# Patient Record
Sex: FEMALE | Race: WHITE
[De-identification: names, ages, dates, MRNs, and addresses within clinical notes are randomized per-mention and may not be internally consistent; named-entity substitution may affect disease eponyms.]

---

## 2020-01-16 ENCOUNTER — HOSPITAL ENCOUNTER (INPATIENT)
Dept: HOSPITAL 19 - COL.ER | Age: 85
LOS: 5 days | Discharge: NURSING, REHAB OR LONG-TERM CARE | DRG: 480 | End: 2020-01-21
Attending: ORTHOPAEDIC SURGERY | Admitting: ORTHOPAEDIC SURGERY
Payer: MEDICARE

## 2020-01-16 VITALS — SYSTOLIC BLOOD PRESSURE: 147 MMHG | HEART RATE: 67 BPM | DIASTOLIC BLOOD PRESSURE: 62 MMHG | TEMPERATURE: 98.5 F

## 2020-01-16 VITALS — HEART RATE: 82 BPM | SYSTOLIC BLOOD PRESSURE: 18125 MMHG | TEMPERATURE: 97.3 F | DIASTOLIC BLOOD PRESSURE: 53 MMHG

## 2020-01-16 VITALS — HEIGHT: 60.98 IN | WEIGHT: 93.92 LBS | BODY MASS INDEX: 17.73 KG/M2

## 2020-01-16 DIAGNOSIS — E89.0: ICD-10-CM

## 2020-01-16 DIAGNOSIS — I50.9: ICD-10-CM

## 2020-01-16 DIAGNOSIS — D72.829: ICD-10-CM
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
## 2020-01-16 DIAGNOSIS — H35.30: ICD-10-CM

## 2020-01-16 DIAGNOSIS — I13.0: ICD-10-CM

## 2020-01-16 DIAGNOSIS — E44.0: ICD-10-CM

## 2020-01-16 DIAGNOSIS — N18.9: ICD-10-CM

## 2020-01-16 DIAGNOSIS — K21.9: ICD-10-CM

## 2020-01-16 DIAGNOSIS — E87.6: ICD-10-CM

## 2020-01-16 DIAGNOSIS — Z90.710: ICD-10-CM

## 2020-01-16 DIAGNOSIS — D64.9: ICD-10-CM

## 2020-01-16 DIAGNOSIS — E78.5: ICD-10-CM

## 2020-01-16 DIAGNOSIS — I08.0: ICD-10-CM

## 2020-01-16 DIAGNOSIS — S42.202A: ICD-10-CM

## 2020-01-16 DIAGNOSIS — J96.01: ICD-10-CM

## 2020-01-16 DIAGNOSIS — F17.210: ICD-10-CM

## 2020-01-16 DIAGNOSIS — S72.142A: Primary | ICD-10-CM

## 2020-01-16 LAB
ALBUMIN SERPL-MCNC: 3.7 GM/DL (ref 3.5–5)
ALP SERPL-CCNC: 99 U/L (ref 50–136)
ALT SERPL-CCNC: 22 U/L (ref 9–52)
ANION GAP SERPL CALC-SCNC: 5 MMOL/L (ref 7–16)
AST SERPL-CCNC: 23 U/L (ref 15–37)
BASOPHILS # BLD: 0 10*3/UL (ref 0–0.2)
BASOPHILS NFR BLD AUTO: 0.3 % (ref 0–2)
BILIRUB SERPL-MCNC: 0.6 MG/DL (ref 0–1)
BUN SERPL-MCNC: 19 MG/DL (ref 7–17)
CALCIUM SERPL-MCNC: 8.9 MG/DL (ref 8.4–10.2)
CHLORIDE SERPL-SCNC: 102 MMOL/L (ref 98–107)
CK SERPL-CCNC: 25 U/L (ref 30–135)
CO2 SERPL-SCNC: 32 MMOL/L (ref 22–30)
CREAT SERPL-SCNC: 0.65 UMOL/L (ref 0.52–1.25)
EOSINOPHIL # BLD: 0 10*3/UL (ref 0–0.7)
EOSINOPHIL NFR BLD: 0 % (ref 0–4)
ERYTHROCYTE [DISTWIDTH] IN BLOOD BY AUTOMATED COUNT: 14.3 % (ref 11.5–14.5)
GLUCOSE SERPL-MCNC: 121 MG/DL (ref 74–106)
GRANULOCYTES # BLD AUTO: 85.7 % (ref 42.2–75.2)
HCT VFR BLD AUTO: 33.2 % (ref 37–47)
HGB BLD-MCNC: 10.5 G/DL (ref 12.5–16)
INR BLD: 1 (ref 0.8–3)
LYMPHOCYTES # BLD: 0.7 10*3/UL (ref 1.2–3.4)
LYMPHOCYTES NFR BLD: 6.6 % (ref 20–51)
MCH RBC QN AUTO: 31 PG (ref 27–31)
MCHC RBC AUTO-ENTMCNC: 32 G/DL (ref 33–37)
MCV RBC AUTO: 97 FL (ref 80–100)
MONOCYTES # BLD: 0.8 10*3/UL (ref 0.1–0.6)
MONOCYTES NFR BLD AUTO: 6.7 % (ref 1.7–9.3)
NEUTROPHILS # BLD: 9.5 10*3/UL (ref 1.4–6.5)
PH UR STRIP.AUTO: 6 [PH] (ref 5–8)
PLATELET # BLD AUTO: 207 K/MM3 (ref 130–400)
PMV BLD AUTO: 9.6 FL (ref 7.4–10.4)
POTASSIUM SERPL-SCNC: 4.1 MMOL/L (ref 3.4–5)
PRE ALBUMIN: 15.2 MG/DL (ref 17.6–36)
PROT SERPL-MCNC: 6.9 GM/DL (ref 6.4–8.2)
PROTHROMBIN TIME: 11.8 SECONDS (ref 9.7–12.8)
RBC # BLD AUTO: 3.44 M/MM3 (ref 4.1–5.3)
RBC # UR: (no result) /HPF
SODIUM SERPL-SCNC: 138 MMOL/L (ref 137–145)
SP GR UR STRIP.AUTO: 1.02 (ref 1–1.03)
UA DIPSTICK PNL UR STRIP.AUTO: (no result)
URN COLLECT METHOD CLASS: (no result)
UROBILINOGEN UR STRIP.AUTO-MCNC: 2 MG/DL

## 2020-01-16 PROCEDURE — A9284 NON-ELECTRONIC SPIROMETER: HCPCS

## 2020-01-16 PROCEDURE — C1713 ANCHOR/SCREW BN/BN,TIS/BN: HCPCS

## 2020-01-16 NOTE — NUR
Patient up from ER alert and oriented x 3. Assessment complete. Daughter at
bedside. Hospitalist in to see patient. Víctor acosta and SCD to RLE. States pain
to left hip 6/10, medications given per orders. Daughter and patient oriented
to room. Initial and 5 page complete. Denies further needs at this time. Will
report off to night shift.

## 2020-01-16 NOTE — NUR
Report received. Assumed care for night shift. Assessment complete. VS stable.
A&Ox3-drowsy. Denies pain/shortness of breath/nausea. IV to Left wrist
#22g-fluids infusing without difficulty. Olmos cath draining clear yellow
urine. LEW/SCD to right lower extremity. Plan of care discussed for bedrest,
NPO status and pain control. Verbalizes understanding. Call light in reach.
Encouraged to call for needs. Will monitor.

## 2020-01-17 VITALS — SYSTOLIC BLOOD PRESSURE: 138 MMHG | TEMPERATURE: 98 F | HEART RATE: 66 BPM | DIASTOLIC BLOOD PRESSURE: 62 MMHG

## 2020-01-17 VITALS — DIASTOLIC BLOOD PRESSURE: 106 MMHG | SYSTOLIC BLOOD PRESSURE: 139 MMHG | HEART RATE: 64 BPM

## 2020-01-17 VITALS — SYSTOLIC BLOOD PRESSURE: 130 MMHG | DIASTOLIC BLOOD PRESSURE: 96 MMHG | HEART RATE: 61 BPM

## 2020-01-17 VITALS — DIASTOLIC BLOOD PRESSURE: 56 MMHG | HEART RATE: 63 BPM | TEMPERATURE: 98.2 F | SYSTOLIC BLOOD PRESSURE: 138 MMHG

## 2020-01-17 VITALS — SYSTOLIC BLOOD PRESSURE: 144 MMHG | HEART RATE: 62 BPM | DIASTOLIC BLOOD PRESSURE: 64 MMHG

## 2020-01-17 VITALS — HEART RATE: 65 BPM | DIASTOLIC BLOOD PRESSURE: 104 MMHG | SYSTOLIC BLOOD PRESSURE: 144 MMHG

## 2020-01-17 VITALS — HEART RATE: 63 BPM | DIASTOLIC BLOOD PRESSURE: 62 MMHG | SYSTOLIC BLOOD PRESSURE: 152 MMHG

## 2020-01-17 VITALS — SYSTOLIC BLOOD PRESSURE: 149 MMHG | DIASTOLIC BLOOD PRESSURE: 63 MMHG | HEART RATE: 58 BPM

## 2020-01-17 VITALS — SYSTOLIC BLOOD PRESSURE: 157 MMHG | DIASTOLIC BLOOD PRESSURE: 51 MMHG | HEART RATE: 58 BPM

## 2020-01-17 VITALS — SYSTOLIC BLOOD PRESSURE: 127 MMHG | HEART RATE: 65 BPM | TEMPERATURE: 98.4 F | DIASTOLIC BLOOD PRESSURE: 45 MMHG

## 2020-01-17 VITALS — HEART RATE: 70 BPM | SYSTOLIC BLOOD PRESSURE: 132 MMHG | DIASTOLIC BLOOD PRESSURE: 59 MMHG

## 2020-01-17 LAB
ANION GAP SERPL CALC-SCNC: 4 MMOL/L (ref 7–16)
BASOPHILS # BLD: 0 10*3/UL (ref 0–0.2)
BASOPHILS NFR BLD AUTO: 0.4 % (ref 0–2)
BUN SERPL-MCNC: 17 MG/DL (ref 7–17)
CALCIUM SERPL-MCNC: 8.3 MG/DL (ref 8.4–10.2)
CHLORIDE SERPL-SCNC: 100 MMOL/L (ref 98–107)
CO2 SERPL-SCNC: 31 MMOL/L (ref 22–30)
CREAT SERPL-SCNC: 0.68 UMOL/L (ref 0.52–1.25)
EOSINOPHIL # BLD: 0 10*3/UL (ref 0–0.7)
EOSINOPHIL NFR BLD: 0.1 % (ref 0–4)
ERYTHROCYTE [DISTWIDTH] IN BLOOD BY AUTOMATED COUNT: 14.4 % (ref 11.5–14.5)
GLUCOSE SERPL-MCNC: 106 MG/DL (ref 74–106)
GRANULOCYTES # BLD AUTO: 75.2 % (ref 42.2–75.2)
HCT VFR BLD AUTO: 29 % (ref 37–47)
HGB BLD-MCNC: 9.2 G/DL (ref 12.5–16)
LYMPHOCYTES # BLD: 1.3 10*3/UL (ref 1.2–3.4)
LYMPHOCYTES NFR BLD: 14.2 % (ref 20–51)
MCH RBC QN AUTO: 31 PG (ref 27–31)
MCHC RBC AUTO-ENTMCNC: 32 G/DL (ref 33–37)
MCV RBC AUTO: 97 FL (ref 80–100)
MONOCYTES # BLD: 0.9 10*3/UL (ref 0.1–0.6)
MONOCYTES NFR BLD AUTO: 9.7 % (ref 1.7–9.3)
NEUTROPHILS # BLD: 6.8 10*3/UL (ref 1.4–6.5)
PLATELET # BLD AUTO: 200 K/MM3 (ref 130–400)
PMV BLD AUTO: 10.1 FL (ref 7.4–10.4)
POTASSIUM SERPL-SCNC: 3.7 MMOL/L (ref 3.4–5)
RBC # BLD AUTO: 2.99 M/MM3 (ref 4.1–5.3)
SODIUM SERPL-SCNC: 136 MMOL/L (ref 137–145)

## 2020-01-17 PROCEDURE — 0QS706Z REPOSITION LEFT UPPER FEMUR WITH INTRAMEDULLARY INTERNAL FIXATION DEVICE, OPEN APPROACH: ICD-10-PCS | Performed by: ORTHOPAEDIC SURGERY

## 2020-01-17 PROCEDURE — BW1JYZZ FLUOROSCOPY OF UPPER EXTREMITY USING OTHER CONTRAST: ICD-10-PCS | Performed by: ORTHOPAEDIC SURGERY

## 2020-01-17 PROCEDURE — 0QS704Z REPOSITION LEFT UPPER FEMUR WITH INTERNAL FIXATION DEVICE, OPEN APPROACH: ICD-10-PCS | Performed by: ORTHOPAEDIC SURGERY

## 2020-01-17 NOTE — NUR
Patient has done well since up from OR. Fluid bolus infusing per orders.
Denies pain at this time. LUE in sling. Left hip sites with gauze. Denies
further needs at this time. Will report off to night shift.

## 2020-01-17 NOTE — NUR
Report received. Assumed care for night shift. Assessment complete. A&Ox3-some
confused conversation but re-orients on own. IV to right forearm pulled
out-cath intact.  Restarted in left [id number] attempt. Blood pressure
taken manually as monitor reading high-VS are WNL. Olmos cath driaining clear
yellow urine.  Output has increased since 1800 fluid bolus. Total of 125 out
in two hours.  Denies pain/nausea/shortness of breath. Dressings x3 to left
hip-gauze/tegaderm. Top dressing totally saturated with bright red blood.
Ortho on call notified with new orders to re-enforce with 4x4s/ABDs/foam tape.
Completed and tolerated well. Will monitor drainage. Denies questions or
concerns. Call light in reach. Bed alarm on. Will monitor.

## 2020-01-17 NOTE — NUR
Contacted Dr. Nelson, patient having low urine out put and SBP in 100's. Fluid
bolus started per orders, will continue to monitor BP.

## 2020-01-17 NOTE — NUR
ASHKAN met with the patient and her daughter, Michelle Whitehead (ph#[phone]), to
discuss discharge plan. The patient lives in [place] with her daughter
(Michelle) and son-in-law. She reports independence with ADLs and has two canes,
two walkers, and a wheelchair available if needed. The patient's PCP is Dr. Ree Bautista and she receives her medications at the [facility] in [facility]. She reports no difficulties obtaining her meds. The patient does not
have advanced directives in EMR, but her her daughter states that she does
have them completed and that they are somewhere at home. Michelle states that
she is that patient's DPOA-HC. The patient had a left hip fracture. ASHKAN
discussed post-acute rehab. The patient and her daughter were agreeable to
this. ASHKAN presented and explained the Patient Choice Form to the patient's
daughter and provided her with Medicare.gov's list of nursing homes in the
[place] area. The patient and her daughter chose 1) [facility] 2) [facility]. Patient Choice Form signed by the patient's
daughter and she was provided a copy. ASHKAN consulted Minerva with [facility]. ASHKAN
contacted and faxed a referral to Felicitas at [facility]. Felicitas, at [facility],
reports that if able to accept, that they would not be able to take on the
weekend. SW awaiting their screens and will continue to follow.

## 2020-01-17 NOTE — NUR
Rested off and on this shift. Pain adequately controlled with oral pain meds.
Ambulated in hallway for approx 100feet this shift. Tolerated well. Denies
nausea/shortness of breath. SCDs/Roque bilat. Has rotated ice off an on this
shift. Call light in reach. WIll monitor.

## 2020-01-17 NOTE — NUR
Patient up from OR, Alert and oriented. Post op VSS. Daughter at bedside.
Denies further needs at this time.

## 2020-01-17 NOTE — NUR
C/O pain to left hip-rates 8/10 to left hip-described as constant
ache/intermittent sharp. Morphine 2mg given IV per dr order due to NPO status.
New consent form signed and on chart. Denies questions or concerns. Denies
nasuea/shortness of breath. Call light in reach. Will monitor.

## 2020-01-18 VITALS — DIASTOLIC BLOOD PRESSURE: 47 MMHG | SYSTOLIC BLOOD PRESSURE: 134 MMHG | HEART RATE: 61 BPM | TEMPERATURE: 98.1 F

## 2020-01-18 VITALS — HEART RATE: 63 BPM | TEMPERATURE: 97.7 F | SYSTOLIC BLOOD PRESSURE: 154 MMHG | DIASTOLIC BLOOD PRESSURE: 58 MMHG

## 2020-01-18 VITALS — SYSTOLIC BLOOD PRESSURE: 162 MMHG | HEART RATE: 62 BPM | DIASTOLIC BLOOD PRESSURE: 60 MMHG | TEMPERATURE: 98 F

## 2020-01-18 VITALS — SYSTOLIC BLOOD PRESSURE: 163 MMHG | TEMPERATURE: 97.5 F | HEART RATE: 69 BPM | DIASTOLIC BLOOD PRESSURE: 67 MMHG

## 2020-01-18 VITALS — HEART RATE: 68 BPM | DIASTOLIC BLOOD PRESSURE: 57 MMHG | SYSTOLIC BLOOD PRESSURE: 167 MMHG | TEMPERATURE: 97.5 F

## 2020-01-18 VITALS — TEMPERATURE: 97.4 F | DIASTOLIC BLOOD PRESSURE: 57 MMHG | HEART RATE: 64 BPM | SYSTOLIC BLOOD PRESSURE: 167 MMHG

## 2020-01-18 VITALS — SYSTOLIC BLOOD PRESSURE: 148 MMHG | DIASTOLIC BLOOD PRESSURE: 68 MMHG

## 2020-01-18 LAB
HCT VFR BLD AUTO: 26.8 % (ref 37–47)
HGB BLD-MCNC: 8.4 G/DL (ref 12.5–16)

## 2020-01-18 NOTE — NUR
Called to room stating she wants to leave and does not want to stay here any
longer. Is A&Ox3-will have a brief moment of confusion but re-orients self and
laughs at her mistake. Rating pain 4/10 to shoulder/hip-Hydrocodone one tab
given per dr order.  Denies nausea/shortness of breath. Discussed sling
again and allowed this nurse to re-apply. Tolerated some clear liquids-refused
PO for most of shift. Denies questions or concerns. Call light in reach/bed
alarm on. Will monitor.

## 2020-01-18 NOTE — NUR
Notified by PCT of elevated blood pressure. Taken manually by this
nurse-148/68. Has been resting better since Morphine/zofran combo. Output has
been 250mls in two hours. Has tolerated some clear liquids. Still refusing to
wear sling or apply ice to hip. Repositioned with pillows. Denies
nausea/pain/shortness of breath. SCDs/TEDs bilat. Call light in reach. Bed
alram on. Will monitor.

## 2020-01-18 NOTE — NUR
Patient has done well throughout the day. Minimal needs. Pain medications
given x2 today per orders. Encouraged patient to increase use of left upper
extremity. Occassionally confused. States she cannot  her left arm, later
will use arm. Has been up in recliner the majority of the day. Olmos catheter
discontiued today and patient voiding without difficulties. Has been up to
comode with x1 assist. Denies further needs at this time. Daughter at bedside
this afternoon. Reported off to night shift.

## 2020-01-18 NOTE — NUR
Repositioned at this time with pillow support. A&Ox3-very agitated. Urine
output has been adequate since-but on the low side of 50mls/hr. Has
continued to take sling off left arm stating she cant take having anything on
that long and around her arm. States she feels as if she is restrained.
Explained importance of sling but adamantly refuses to reapply. Complaining of
nausea as well as pain. Dry heaved several times, has not tolerated any PO
except some water. Refused any type of PO intake thus far. Zofran 4mg given
IV per dr order followed by Morphine 2mg for pain to left hip/left shoulder.
States left hip pain is mild compared to left shoulder pain. Rating pain 8/10
described as a constant ache/throb.  Seems slightly agitated. States she
does not want to be here any longer and is ready to go home. States she will
not go to therapy as she had to do that before and would rather die. After
discussing she understands the importance but states she still will just leave
today. States she is much more comfortable than she was and will try to
rest-"lack of sleep has me in a bad way." Will continue to monitor.

## 2020-01-19 VITALS — SYSTOLIC BLOOD PRESSURE: 130 MMHG | HEART RATE: 61 BPM | TEMPERATURE: 97.9 F | DIASTOLIC BLOOD PRESSURE: 49 MMHG

## 2020-01-19 VITALS — SYSTOLIC BLOOD PRESSURE: 166 MMHG | DIASTOLIC BLOOD PRESSURE: 72 MMHG | TEMPERATURE: 98.2 F | HEART RATE: 78 BPM

## 2020-01-19 VITALS — HEART RATE: 62 BPM | DIASTOLIC BLOOD PRESSURE: 49 MMHG | SYSTOLIC BLOOD PRESSURE: 125 MMHG | TEMPERATURE: 98.2 F

## 2020-01-19 VITALS — TEMPERATURE: 98.8 F | DIASTOLIC BLOOD PRESSURE: 49 MMHG | HEART RATE: 66 BPM | SYSTOLIC BLOOD PRESSURE: 155 MMHG

## 2020-01-19 VITALS — SYSTOLIC BLOOD PRESSURE: 157 MMHG | DIASTOLIC BLOOD PRESSURE: 50 MMHG | TEMPERATURE: 97.9 F | HEART RATE: 66 BPM

## 2020-01-19 LAB
ANION GAP SERPL CALC-SCNC: 4 MMOL/L (ref 7–16)
BASOPHILS # BLD: 0 10*3/UL (ref 0–0.2)
BASOPHILS NFR BLD AUTO: 0.1 % (ref 0–2)
BUN SERPL-MCNC: 17 MG/DL (ref 7–17)
CALCIUM SERPL-MCNC: 8.5 MG/DL (ref 8.4–10.2)
CHLORIDE SERPL-SCNC: 102 MMOL/L (ref 98–107)
CO2 SERPL-SCNC: 30 MMOL/L (ref 22–30)
CREAT SERPL-SCNC: 0.68 UMOL/L (ref 0.52–1.25)
EOSINOPHIL # BLD: 0 10*3/UL (ref 0–0.7)
EOSINOPHIL NFR BLD: 0 % (ref 0–4)
ERYTHROCYTE [DISTWIDTH] IN BLOOD BY AUTOMATED COUNT: 14.6 % (ref 11.5–14.5)
GLUCOSE SERPL-MCNC: 78 MG/DL (ref 74–106)
GRANULOCYTES # BLD AUTO: 80.2 % (ref 42.2–75.2)
HCT VFR BLD AUTO: 25.3 % (ref 37–47)
HGB BLD-MCNC: 8.1 G/DL (ref 12.5–16)
LYMPHOCYTES # BLD: 1 10*3/UL (ref 1.2–3.4)
LYMPHOCYTES NFR BLD: 11.8 % (ref 20–51)
MCH RBC QN AUTO: 31 PG (ref 27–31)
MCHC RBC AUTO-ENTMCNC: 32 G/DL (ref 33–37)
MCV RBC AUTO: 97 FL (ref 80–100)
MONOCYTES # BLD: 0.6 10*3/UL (ref 0.1–0.6)
MONOCYTES NFR BLD AUTO: 7.4 % (ref 1.7–9.3)
NEUTROPHILS # BLD: 6.6 10*3/UL (ref 1.4–6.5)
PLATELET # BLD AUTO: 193 K/MM3 (ref 130–400)
PMV BLD AUTO: 9.7 FL (ref 7.4–10.4)
POTASSIUM SERPL-SCNC: 3.6 MMOL/L (ref 3.4–5)
RBC # BLD AUTO: 2.61 M/MM3 (ref 4.1–5.3)
SODIUM SERPL-SCNC: 136 MMOL/L (ref 137–145)

## 2020-01-19 NOTE — NUR
Patient resting in bed. Reina assisted her with a shower this afternoon. Her
family also visited. Norco for pain managment. She continues to have more
complaints of shoulder pain than hip pain, that is worse with movement. She
has been continent of urine, using bedside commode with assist. Int. Teds off
at this time after shower. Patient has tolerated meals. Alerternative meals
offered. She is wanting to nap. Will monitor

## 2020-01-19 NOTE — NUR
PT MEDICATED TWICE FOR HIP PAIN.  PT TRANSFERS POORLY.  PT NOT VERY MOTIVATED
TO MOVE.  NEW ICE APPLIED.

## 2020-01-19 NOTE — NUR
Patient resting in bed. She worked with therapy and did fairly well. She
tolerated her breakfast. Reports not being a big eater. Ortho rounded. Left
hip drg CDI. Complaints of L.shoudler pain. Teds & scds. Int. Will monitor.

## 2020-01-20 VITALS — DIASTOLIC BLOOD PRESSURE: 93 MMHG | SYSTOLIC BLOOD PRESSURE: 123 MMHG | TEMPERATURE: 100 F | HEART RATE: 78 BPM

## 2020-01-20 VITALS — SYSTOLIC BLOOD PRESSURE: 159 MMHG | HEART RATE: 73 BPM | TEMPERATURE: 99.8 F | DIASTOLIC BLOOD PRESSURE: 54 MMHG

## 2020-01-20 VITALS — SYSTOLIC BLOOD PRESSURE: 125 MMHG | HEART RATE: 62 BPM | TEMPERATURE: 98.8 F | DIASTOLIC BLOOD PRESSURE: 42 MMHG

## 2020-01-20 VITALS — HEART RATE: 71 BPM | TEMPERATURE: 98.6 F | SYSTOLIC BLOOD PRESSURE: 136 MMHG | DIASTOLIC BLOOD PRESSURE: 42 MMHG

## 2020-01-20 VITALS — TEMPERATURE: 99.4 F | DIASTOLIC BLOOD PRESSURE: 45 MMHG | HEART RATE: 74 BPM | SYSTOLIC BLOOD PRESSURE: 145 MMHG

## 2020-01-20 VITALS — HEART RATE: 82 BPM | DIASTOLIC BLOOD PRESSURE: 63 MMHG | SYSTOLIC BLOOD PRESSURE: 165 MMHG | TEMPERATURE: 99 F

## 2020-01-20 LAB
ANION GAP SERPL CALC-SCNC: 3 MMOL/L (ref 7–16)
BASOPHILS # BLD: 0 10*3/UL (ref 0–0.2)
BASOPHILS NFR BLD AUTO: 0.2 % (ref 0–2)
BUN SERPL-MCNC: 18 MG/DL (ref 7–17)
CALCIUM SERPL-MCNC: 8.1 MG/DL (ref 8.4–10.2)
CHLORIDE SERPL-SCNC: 99 MMOL/L (ref 98–107)
CO2 SERPL-SCNC: 32 MMOL/L (ref 22–30)
CREAT SERPL-SCNC: 0.72 UMOL/L (ref 0.52–1.25)
EOSINOPHIL # BLD: 0 10*3/UL (ref 0–0.7)
EOSINOPHIL NFR BLD: 0 % (ref 0–4)
ERYTHROCYTE [DISTWIDTH] IN BLOOD BY AUTOMATED COUNT: 14.7 % (ref 11.5–14.5)
GLUCOSE SERPL-MCNC: 81 MG/DL (ref 74–106)
GRANULOCYTES # BLD AUTO: 83.7 % (ref 42.2–75.2)
HCT VFR BLD AUTO: 22.9 % (ref 37–47)
HGB BLD-MCNC: 7.3 G/DL (ref 12.5–16)
LYMPHOCYTES # BLD: 0.8 10*3/UL (ref 1.2–3.4)
LYMPHOCYTES NFR BLD: 8.4 % (ref 20–51)
MCH RBC QN AUTO: 31 PG (ref 27–31)
MCHC RBC AUTO-ENTMCNC: 32 G/DL (ref 33–37)
MCV RBC AUTO: 97 FL (ref 80–100)
MONOCYTES # BLD: 0.7 10*3/UL (ref 0.1–0.6)
MONOCYTES NFR BLD AUTO: 7.2 % (ref 1.7–9.3)
NEUTROPHILS # BLD: 7.8 10*3/UL (ref 1.4–6.5)
PLATELET # BLD AUTO: 191 K/MM3 (ref 130–400)
PMV BLD AUTO: 9.8 FL (ref 7.4–10.4)
POTASSIUM SERPL-SCNC: 3.7 MMOL/L (ref 3.4–5)
RBC # BLD AUTO: 2.37 M/MM3 (ref 4.1–5.3)
SODIUM SERPL-SCNC: 134 MMOL/L (ref 137–145)

## 2020-01-20 NOTE — NUR
Patient has done well throughout the day, minimal needs. States pain is better
today. Has been up ambulatig to restroom with 1 assist and walker, steady
gait. Daughter at bedside this afternoon. Denies further needs at this time.
Will report off to night shift.

## 2020-01-20 NOTE — NUR
Pain medication given per orders. Requires one assist to transfer to the
commode for toileting. States significant pain to left hip. Dressing has
minimal amount of drainage to site. Pain medication effective. Patient has
slept well throughout the night. Denies any further needs. Will continue to
monitor.

## 2020-01-20 NOTE — NUR
was notified by MICHAEL Alonzo that patient is cleared for
discharge. ASHKAN contacted Neva, [facility] Director and spoke with Hospitalist who
advised [facility] does not have bed availability at this time. ASHKAN contacted Felicitas at
[facility] who advised she would have to submit for prior authorization as
patient has [employer]. Later in the afternoon, Felicitas contacted ASHKAN to inform her
that [facility] is not an in network provider. ASHKAN contacted Michelle
(ph#[phone]), patient's daughter to provide update. Michelle expressed
frustration and gave ASHKAN permission to send referrals to the three SNFs in
[place]. ASHKAN contacted [facility], [facility], and [facility] then
faxed referrals. ASHKAN advised each facility that patient has [employer]. SW provided
update to patient. Patient's daughter also inquired about pricing for a walker
with a seat and brakes. ASHKAN contacted [facility] then
provided Michelle with pricing. ASHKAN advised Michelle that she could purchase item
at her preferred DME provider. SW to continue to follow.

## 2020-01-20 NOTE — NUR
Patient in bed resting. Alert and oriented x 3. Assessment complete. Aquacell
dressing to left hip with minimal drainage present. Tedhose and SCDs to BLE.
Assisted patient to bedside comode x1 assist. Denies further needs at this
time.

## 2020-01-21 VITALS — HEART RATE: 69 BPM | SYSTOLIC BLOOD PRESSURE: 139 MMHG | TEMPERATURE: 98.3 F | DIASTOLIC BLOOD PRESSURE: 41 MMHG

## 2020-01-21 VITALS — TEMPERATURE: 98.3 F | SYSTOLIC BLOOD PRESSURE: 139 MMHG | HEART RATE: 69 BPM | DIASTOLIC BLOOD PRESSURE: 41 MMHG

## 2020-01-21 VITALS — HEART RATE: 65 BPM | SYSTOLIC BLOOD PRESSURE: 151 MMHG | DIASTOLIC BLOOD PRESSURE: 55 MMHG | TEMPERATURE: 98.5 F

## 2020-01-21 VITALS — HEART RATE: 66 BPM | TEMPERATURE: 99.1 F | DIASTOLIC BLOOD PRESSURE: 50 MMHG | SYSTOLIC BLOOD PRESSURE: 136 MMHG

## 2020-01-21 LAB
ANION GAP SERPL CALC-SCNC: 6 MMOL/L (ref 7–16)
BASOPHILS # BLD: 0 10*3/UL (ref 0–0.2)
BASOPHILS NFR BLD AUTO: 0.2 % (ref 0–2)
BUN SERPL-MCNC: 13 MG/DL (ref 7–17)
CALCIUM SERPL-MCNC: 8.6 MG/DL (ref 8.4–10.2)
CHLORIDE SERPL-SCNC: 96 MMOL/L (ref 98–107)
CO2 SERPL-SCNC: 35 MMOL/L (ref 22–30)
CREAT SERPL-SCNC: 0.65 UMOL/L (ref 0.52–1.25)
EOSINOPHIL # BLD: 0 10*3/UL (ref 0–0.7)
EOSINOPHIL NFR BLD: 0.1 % (ref 0–4)
ERYTHROCYTE [DISTWIDTH] IN BLOOD BY AUTOMATED COUNT: 14.7 % (ref 11.5–14.5)
GLUCOSE SERPL-MCNC: 101 MG/DL (ref 74–106)
GRANULOCYTES # BLD AUTO: 80 % (ref 42.2–75.2)
HCT VFR BLD AUTO: 25.9 % (ref 37–47)
HGB BLD-MCNC: 8.2 G/DL (ref 12.5–16)
LYMPHOCYTES # BLD: 1.1 10*3/UL (ref 1.2–3.4)
LYMPHOCYTES NFR BLD: 12.5 % (ref 20–51)
MCH RBC QN AUTO: 31 PG (ref 27–31)
MCHC RBC AUTO-ENTMCNC: 32 G/DL (ref 33–37)
MCV RBC AUTO: 97 FL (ref 80–100)
MONOCYTES # BLD: 0.6 10*3/UL (ref 0.1–0.6)
MONOCYTES NFR BLD AUTO: 6.5 % (ref 1.7–9.3)
NEUTROPHILS # BLD: 7 10*3/UL (ref 1.4–6.5)
PLATELET # BLD AUTO: 211 K/MM3 (ref 130–400)
PMV BLD AUTO: 9.7 FL (ref 7.4–10.4)
POTASSIUM SERPL-SCNC: 3.3 MMOL/L (ref 3.4–5)
RBC # BLD AUTO: 2.66 M/MM3 (ref 4.1–5.3)
SODIUM SERPL-SCNC: 137 MMOL/L (ref 137–145)

## 2020-01-21 NOTE — NUR
Patient in bed resting. alert and oriented x 3. Assessment complete. Patient
has been up ambulating to restroom with walker and stand by assist. Denies
pain at this time. Denies further needs at this time. Will continue to
monitior.

## 2020-01-21 NOTE — NUR
Patient doing well this AM. Has been up ambulating to restroom with
walker. Aquacell to left hip with minimal drainage. Pedal pulses intact. SCDs
and Víctor hose to BLE. Denies further needs at this time.

## 2020-01-21 NOTE — NUR
Patient transfering to [facility]. Grand son at bedside. Assisted patient to
wheelchair. Deneis pain or further needs at this time.

## 2020-01-21 NOTE — NUR
The patient to to discharge this day, 1/21 to [facility]
for a skilled nursing stay. The patient will be transported at 1530. ASHKAN
informed the team and the patient's family, all were in agreeance. ASHKAN faxed
discharge orders to [facility]. ASHKAN informed and thanked all other referrals.ASHKAN
presented the IM form to the patient and the patient's grandson and grandson's
wife. The patient and family understood. Patient had grandson sign the form. A
copy was provided to the family and original was placed in the chart. There
are no additional needs at this time.

## 2020-01-21 NOTE — NUR
Patient has rested well throughout the night. PRN pain medication given at HS
for pain to left hip. Patient calls to transfer to toilet. Denies any further
needs. Will continue to monitor.

## 2020-01-21 NOTE — NUR
was contacted by Paradise at [facility] who advised patient's
daughter, Michelle contacted them about placement. SW faxed referral and copy of
patient's [facility] care to [facility]. Paradise from [facility] asked for ICD-10
codes for patient fractures. ASHKAN collaborated with YANNI Leger then provided the
codes to [facility]. ASHKAN received a message from the previous evening that advised
Doreen from [facility] visited with patient. SW was contacted by Darline at
[facility] who advised she had a reference number from [facility] but was waiting
on final authorization. Darline advised she spoke with Michelle about this. ASHKAN
spoke with [facility]a [facility] Director who advised she will start authorization
process as a bed may be available tomorrow. ASHKAN contacted Michelle who states [facility]
is still patient's first preference. ASHKAN contacted MICHAEL Alonzo to provide
update. ASHKAN to continue to follow.